# Patient Record
Sex: FEMALE | Race: WHITE | URBAN - METROPOLITAN AREA
[De-identification: names, ages, dates, MRNs, and addresses within clinical notes are randomized per-mention and may not be internally consistent; named-entity substitution may affect disease eponyms.]

---

## 2017-01-19 PROBLEM — M50.20 HNP (HERNIATED NUCLEUS PULPOSUS), CERVICAL: Status: ACTIVE | Noted: 2017-01-19

## 2017-02-07 PROBLEM — E66.09 NON MORBID OBESITY DUE TO EXCESS CALORIES: Status: ACTIVE | Noted: 2017-02-07

## 2017-03-02 PROBLEM — M75.50 SHOULDER BURSITIS: Status: ACTIVE | Noted: 2017-03-02

## 2017-03-02 PROBLEM — M50.222 HERNIATED NUCLEUS PULPOSUS, C5-6 RIGHT: Status: ACTIVE | Noted: 2017-03-02

## 2017-03-03 PROBLEM — M47.817 LUMBOSACRAL SPONDYLOSIS WITHOUT MYELOPATHY: Status: ACTIVE | Noted: 2017-03-03

## 2018-05-03 PROBLEM — M79.7 FIBROMYALGIA AFFECTING MULTIPLE SITES: Status: ACTIVE | Noted: 2018-05-03

## 2018-05-03 PROBLEM — M50.221 HERNIATED NUCLEUS PULPOSUS, C4-5: Status: ACTIVE | Noted: 2017-03-02

## 2018-05-03 PROBLEM — G90.2 HORNER'S SYNDROME: Status: ACTIVE | Noted: 2018-05-03

## 2018-05-03 PROBLEM — G44.209 MUSCLE CONTRACTION HEADACHE SYNDROME: Status: ACTIVE | Noted: 2018-05-03

## 2022-09-28 ENCOUNTER — INITIAL CONSULT (OUTPATIENT)
Dept: PAIN MANAGEMENT | Age: 47
End: 2022-09-28
Payer: COMMERCIAL

## 2022-09-28 VITALS
DIASTOLIC BLOOD PRESSURE: 86 MMHG | BODY MASS INDEX: 31.89 KG/M2 | HEIGHT: 63 IN | SYSTOLIC BLOOD PRESSURE: 128 MMHG | WEIGHT: 180 LBS | TEMPERATURE: 97.9 F

## 2022-09-28 DIAGNOSIS — M96.1 CERVICAL POST-LAMINECTOMY SYNDROME: ICD-10-CM

## 2022-09-28 DIAGNOSIS — M47.817 LUMBOSACRAL SPONDYLOSIS WITHOUT MYELOPATHY: Primary | ICD-10-CM

## 2022-09-28 DIAGNOSIS — M51.36 DDD (DEGENERATIVE DISC DISEASE), LUMBAR: ICD-10-CM

## 2022-09-28 DIAGNOSIS — M47.812 CERVICAL SPONDYLOSIS WITHOUT MYELOPATHY: ICD-10-CM

## 2022-09-28 DIAGNOSIS — M54.16 LUMBAR RADICULOPATHY: ICD-10-CM

## 2022-09-28 PROCEDURE — 4004F PT TOBACCO SCREEN RCVD TLK: CPT | Performed by: PAIN MEDICINE

## 2022-09-28 PROCEDURE — G8427 DOCREV CUR MEDS BY ELIG CLIN: HCPCS | Performed by: PAIN MEDICINE

## 2022-09-28 PROCEDURE — 99203 OFFICE O/P NEW LOW 30 MIN: CPT | Performed by: PAIN MEDICINE

## 2022-09-28 PROCEDURE — G8417 CALC BMI ABV UP PARAM F/U: HCPCS | Performed by: PAIN MEDICINE

## 2022-09-28 RX ORDER — ARIPIPRAZOLE 15 MG/1
15 TABLET ORAL DAILY
COMMUNITY
Start: 2021-08-05

## 2022-09-28 RX ORDER — SERTRALINE HYDROCHLORIDE 100 MG/1
TABLET, FILM COATED ORAL
COMMUNITY
Start: 2022-08-04

## 2022-09-28 RX ORDER — ALPRAZOLAM 1 MG/1
TABLET ORAL
COMMUNITY
Start: 2022-08-29

## 2022-09-28 RX ORDER — OXYCODONE AND ACETAMINOPHEN 10; 325 MG/1; MG/1
TABLET ORAL
COMMUNITY
Start: 2022-06-30

## 2022-09-28 ASSESSMENT — ENCOUNTER SYMPTOMS
RESPIRATORY NEGATIVE: 1
EYES NEGATIVE: 1
ALLERGIC/IMMUNOLOGIC NEGATIVE: 1
GASTROINTESTINAL NEGATIVE: 1

## 2022-09-28 NOTE — PROGRESS NOTES
History of Present Illness     Patient Identification  Jodee Schmitt is a 52 y.o. female. Patient information was obtained from patient. Chief Complaint   Chief Complaint   Patient presents with    Neck Pain    Back Pain       Patient presents with complaint of back pain. This is a result of fall over stairs. Onset of pain was 9 years ago and has been gradually worsening since. The pain is located in right lower back, also has sciatica on Left described as aching in back throbbing in left leg and rated as severe and 10 / 10, with radiation. Symptoms include leg weakness. The patient denies numbness, incontinence. Care prior to arrival consisted of PT, Injections, pain meds with moderate relief. Neck pain 2014, from same accident, radiates to both arms, was told she has capal tunnel syndrome and radiculopathy, weakness drops things off her hands. Past Medical History:   Diagnosis Date    Chronic back pain      Family History   Problem Relation Age of Onset    Arthritis Mother     High Blood Pressure Mother      Current Outpatient Medications   Medication Sig Dispense Refill    ALPRAZolam (XANAX) 1 MG tablet TAKE 1 TABLET BY MOUTH TWICE DAILY      sertraline (ZOLOFT) 100 MG tablet TAKE 1 TABLET BY MOUTH ONCE DAILY      ARIPiprazole (ABILIFY) 15 MG tablet Take 15 mg by mouth daily      oxyCODONE-acetaminophen (PERCOCET)  MG per tablet TAKE 1 TABLET BY MOUTH EVERY 6 HOURS AS NEEDED      ibuprofen (ADVIL) 200 MG tablet Take 1 tablet by mouth 3 times daily 90 tablet 2    gabapentin (NEURONTIN) 300 MG capsule Take 1 capsule by mouth 3 times daily 90 capsule 2    gabapentin (NEURONTIN) 300 MG capsule Take 1 capsule by mouth 3 times daily 90 capsule 0     No current facility-administered medications for this visit. Allergies   Allergen Reactions    Penicillins Hives     rash    Penicillin G        Review of Systems  Review of Systems   Constitutional: Negative. HENT: Negative. Eyes: Negative. Rt sided Horners syndrome, Lazy Eye. Respiratory: Negative. Cardiovascular: Negative. Gastrointestinal: Negative. Endocrine: Negative. Genitourinary: Negative. Hysterectomy   Musculoskeletal: Negative. Skin: Negative. Allergic/Immunologic: Negative. Neurological: Negative. Hematological: Negative. Psychiatric/Behavioral: Negative. Anxiwety, depression. All other systems reviewed and are negative. Physical Exam     /86 (Site: Left Upper Arm)   Temp 97.9 °F (36.6 °C) (Temporal)   Ht 5' 3\" (1.6 m)   Wt 180 lb (81.6 kg)   BMI 31.89 kg/m²   Physical Exam  Vitals and nursing note reviewed. Constitutional:       Appearance: Normal appearance. HENT:      Head: Normocephalic. Right Ear: Ear canal normal.      Left Ear: Ear canal normal.      Nose: Nose normal.      Mouth/Throat:      Mouth: Mucous membranes are moist.   Eyes:      Extraocular Movements: Extraocular movements intact. Conjunctiva/sclera: Conjunctivae normal.      Pupils: Pupils are equal, round, and reactive to light. Cardiovascular:      Rate and Rhythm: Normal rate and regular rhythm. Pulses: Normal pulses. Heart sounds: Normal heart sounds. Pulmonary:      Effort: Pulmonary effort is normal.      Breath sounds: Normal breath sounds. Abdominal:      General: Abdomen is flat. Bowel sounds are normal.      Palpations: Abdomen is soft. Musculoskeletal:         General: Normal range of motion. Cervical back: Normal range of motion and neck supple. Comments: Good gait with a lot of pain also on walking on toes and heels, pain on Flexion and extension in both neck and back, Tenderness in tears almost every where, in neck and back, SLRs painful, Gainslins painful. Skin:     General: Skin is warm. Capillary Refill: Capillary refill takes less than 2 seconds. Neurological:      General: No focal deficit present.       Mental Status: She is alert and oriented to person, place, and time. Mental status is at baseline. Psychiatric:         Mood and Affect: Mood normal.         Behavior: Behavior normal.         Thought Content: Thought content normal.         Judgment: Judgment normal.         Plan     Pt has several procedures from several competent pain docs,   Discussed IT Pump.  Discussed Interventions but they are not practical as we will be needing several procedures which she had possible done before, will follow up in few weeks to Discuss pump placement

## 2025-04-04 NOTE — PROGRESS NOTES
Patient Name: Lorna Sales : 1975        Date: 2025      Type of Appt: Consult    Reason for appt: PSR NECK AND BACK PAIN. XRAY DONE AT [facility]. PREVIOUSLY SEEN AND HAD SURGERY WITH DR. BARKSDALE     Referred by: PSR    Studies done: 3/31/25 Xray of Lumbar [facility]  3/31/25 Xray of Cervical Spine    ( [facility] only pushed Cervical Spine to [facility] )    Surgeries: 2017 Cervical Fusion with Dr Barksdale     Conservative Treatments (Have you ever tried any)  Physical Therapy in the last 6 months to a year: No  NSAID's: Yes  Narcotics: Yes  Muscle relaxants: No  Epidural injections: No    Any Blood Thinners :  [] Yes  [x] No       [] Aspirin    [] Eliquis     [] Xarelto    [] Pletal   [] Plavix    [] Warfarin        Diabetic:  [] Yes   [] No   If yes, prescribed insulin: [] Yes   [x]  No      Do you take any : [] Yes   [x]  No      [] Ozempic   [] Wegovy    [] Trulicity    [] Mounjaro     Smoking: [] Yes   [x] No      REVIEW OF SYSTEMS:    [] Rash     [] Difficulty Urinating   [] Nausea    [] Fever      [x] Headaches    [] Bruising/Bleeding Easily    [] Hearing loss     [] Constipation     [x] Sleep Disturbance   [] Shortness of breath    [] Diarrhea   [x] Neck Pain    [x] Back Pain                         [facility]  [address] [place] [id number]  P: [phone]  F: [phone]          Patient:  Lorna Sales  YOB: 1975  Date: 2025    The patient is a 49 y.o. female who presents today for evaluation of the following problems:     Chief Complaint   Patient presents with    Consultation     3/31/25 Xray of Cervical Spine and and Xray of Lumbar Spine     Neck Pain    Back Pain        Referred by No ref. provider found      PAST MEDICAL, FAMILY AND SOCIAL HISTORY:  Past Medical History:   Diagnosis Date    Chronic back pain      Past Surgical History:   Procedure Laterality Date    SPINE SURGERY  2017    cervical     Family History   Problem Relation

## 2025-04-17 ENCOUNTER — OFFICE VISIT (OUTPATIENT)
Age: 50
End: 2025-04-17
Payer: COMMERCIAL

## 2025-04-17 VITALS — WEIGHT: 175 LBS | BODY MASS INDEX: 34.36 KG/M2 | RESPIRATION RATE: 18 BRPM | HEIGHT: 60 IN

## 2025-04-17 DIAGNOSIS — M47.812 CERVICAL SPONDYLOSIS: ICD-10-CM

## 2025-04-17 DIAGNOSIS — G99.2 STENOSIS OF CERVICAL SPINE WITH MYELOPATHY (HCC): Primary | ICD-10-CM

## 2025-04-17 DIAGNOSIS — M54.16 LEFT LUMBAR RADICULOPATHY: ICD-10-CM

## 2025-04-17 DIAGNOSIS — M47.816 LUMBAR SPONDYLOSIS: ICD-10-CM

## 2025-04-17 DIAGNOSIS — M48.02 STENOSIS OF CERVICAL SPINE WITH MYELOPATHY (HCC): Primary | ICD-10-CM

## 2025-04-17 DIAGNOSIS — M75.51 BURSITIS OF RIGHT SHOULDER: ICD-10-CM

## 2025-04-17 DIAGNOSIS — R29.898 RIGHT LEG WEAKNESS: ICD-10-CM

## 2025-04-17 PROCEDURE — G8417 CALC BMI ABV UP PARAM F/U: HCPCS | Performed by: NEUROLOGICAL SURGERY

## 2025-04-17 PROCEDURE — 1036F TOBACCO NON-USER: CPT | Performed by: NEUROLOGICAL SURGERY

## 2025-04-17 PROCEDURE — 99204 OFFICE O/P NEW MOD 45 MIN: CPT | Performed by: NEUROLOGICAL SURGERY

## 2025-04-17 PROCEDURE — 99203 OFFICE O/P NEW LOW 30 MIN: CPT | Performed by: NEUROLOGICAL SURGERY

## 2025-04-17 PROCEDURE — G8427 DOCREV CUR MEDS BY ELIG CLIN: HCPCS | Performed by: NEUROLOGICAL SURGERY

## 2025-04-17 RX ORDER — DULOXETIN HYDROCHLORIDE 30 MG/1
CAPSULE, DELAYED RELEASE ORAL
COMMUNITY

## 2025-04-17 RX ORDER — TRAMADOL HYDROCHLORIDE 50 MG/1
50 TABLET ORAL EVERY 8 HOURS PRN
Qty: 90 TABLET | Refills: 0 | Status: SHIPPED | OUTPATIENT
Start: 2025-04-17 | End: 2025-05-17

## 2025-04-17 NOTE — PATIENT INSTRUCTIONS
Welcome to our practice and to our [facility] Family! Thank you for choosing us as your health care provider.  In the coming days, you may receive a survey about your visit via text or email. Please take a few minutes to answer these   questions. As our patient, we value your opinion and appreciate your feedback about your [facility] Experience.    The Team That Took Care of you Today:    Care Provider(s): Dr Barksdale     Associate(s):_____________________________________ __________

## 2025-04-21 ENCOUNTER — TELEPHONE (OUTPATIENT)
Age: 50
End: 2025-04-21

## 2025-04-21 NOTE — TELEPHONE ENCOUNTER
Called patient this morning.  She is still dealing with same complaints that she had a last visit with Dr. Barksdale on 4/17/2025.  Today neck pain is worse.  Tramadol prescription is not helping much.     Recommend if tramadol continues to not help, can alternate Tylenol/ibuprofen.  Heat, ice, light stretching and also may help.  Any other pain medication recommend she follow-up with her PCP.  She is to call this morning to start scheduling imaging that Dr. Barksdale has ordered.  Once imaging is obtained, she has follow-up appointment scheduled in 1 month.    Madi Nunez PA-C

## 2025-05-05 ENCOUNTER — TELEPHONE (OUTPATIENT)
Age: 50
End: 2025-05-05

## 2025-05-05 DIAGNOSIS — M47.812 CERVICAL SPONDYLOSIS WITHOUT MYELOPATHY: ICD-10-CM

## 2025-05-05 DIAGNOSIS — M75.52 BURSITIS OF LEFT SHOULDER: ICD-10-CM

## 2025-05-05 DIAGNOSIS — M54.16 LUMBAR RADICULOPATHY: Primary | ICD-10-CM

## 2025-05-05 RX ORDER — OXYCODONE AND ACETAMINOPHEN 7.5; 325 MG/1; MG/1
1 TABLET ORAL 2 TIMES DAILY
Qty: 14 TABLET | Refills: 0 | Status: SHIPPED | OUTPATIENT
Start: 2025-05-05 | End: 2025-05-12

## 2025-05-05 NOTE — TELEPHONE ENCOUNTER
Called patient this morning.  She is continuing to have severe neck pain.  Additionally having severe UE and LE pain.  She is unable to sleep.  She has had to stop working.  Pain is significantly limiting her daily function.  She is scheduled to get an MRI in 2 weeks and have follow-up shortly after with Dr. Barksdale.     She has tried tramadol with no relief.  Additionally has tried OTC ibuprofen/Tylenol, heat/ice, light range of motion.  She was previously given short prescription of Percocet 2 months ago in urgent care for pain.  This significantly helped and allowed her to function previously.    Will place a 7-day prescription for Percocet 7.5-325 mg.  Patient understands any other pain medication management after this will need to be through PCP or pain management.  She understands to take as little as possible and try to wean doses and make them last longer than 7 days.  She will take them only when needed.  PDMP reviewed.    Madi Nunez PA-C

## 2025-05-13 ENCOUNTER — TELEPHONE (OUTPATIENT)
Age: 50
End: 2025-05-13

## 2025-05-13 NOTE — TELEPHONE ENCOUNTER
PATIENTS SCHEDULED MRI WAS DENIED DUE TO NOT MEDICALLY NECESSARY.  REF: [id number]  PEER TO PEER BEING REQUESTED   [phone]

## 2025-05-14 ENCOUNTER — APPOINTMENT (OUTPATIENT)
Dept: MRI IMAGING | Age: 50
End: 2025-05-14
Payer: COMMERCIAL

## 2025-05-14 ENCOUNTER — HOSPITAL ENCOUNTER (OUTPATIENT)
Dept: MRI IMAGING | Age: 50
Discharge: HOME | End: 2025-05-16
Payer: COMMERCIAL

## 2025-05-14 DIAGNOSIS — M47.812 CERVICAL SPONDYLOSIS WITHOUT MYELOPATHY: Primary | ICD-10-CM

## 2025-05-14 DIAGNOSIS — M75.52 BURSITIS OF LEFT SHOULDER: ICD-10-CM

## 2025-05-14 DIAGNOSIS — M47.812 CERVICAL SPONDYLOSIS: ICD-10-CM

## 2025-05-14 DIAGNOSIS — M75.51 BURSITIS OF RIGHT SHOULDER: ICD-10-CM

## 2025-05-14 DIAGNOSIS — M47.816 LUMBAR SPONDYLOSIS: ICD-10-CM

## 2025-05-14 DIAGNOSIS — M48.02 STENOSIS OF CERVICAL SPINE WITH MYELOPATHY (HCC): ICD-10-CM

## 2025-05-14 DIAGNOSIS — M48.02 STENOSIS OF CERVICAL SPINE WITH MYELOPATHY (HCC): Primary | ICD-10-CM

## 2025-05-14 DIAGNOSIS — M54.16 LEFT LUMBAR RADICULOPATHY: ICD-10-CM

## 2025-05-14 DIAGNOSIS — M54.16 LUMBAR RADICULOPATHY: ICD-10-CM

## 2025-05-14 DIAGNOSIS — R29.898 RIGHT LEG WEAKNESS: ICD-10-CM

## 2025-05-14 DIAGNOSIS — M47.812 CERVICAL SPONDYLOSIS WITHOUT MYELOPATHY: ICD-10-CM

## 2025-05-14 DIAGNOSIS — G99.2 STENOSIS OF CERVICAL SPINE WITH MYELOPATHY (HCC): ICD-10-CM

## 2025-05-14 DIAGNOSIS — G99.2 STENOSIS OF CERVICAL SPINE WITH MYELOPATHY (HCC): Primary | ICD-10-CM

## 2025-05-14 PROCEDURE — 72148 MRI LUMBAR SPINE W/O DYE: CPT

## 2025-05-14 PROCEDURE — 73221 MRI JOINT UPR EXTREM W/O DYE: CPT

## 2025-05-14 RX ORDER — OXYCODONE AND ACETAMINOPHEN 5; 325 MG/1; MG/1
1 TABLET ORAL EVERY 6 HOURS PRN
Qty: 28 TABLET | Refills: 0 | Status: SHIPPED | OUTPATIENT
Start: 2025-05-14 | End: 2025-05-21

## 2025-05-14 NOTE — TELEPHONE ENCOUNTER
Called and spoke with Lorna regarding physical therapy and the MRI denial. She is agreeable and will set up an appointment with physical therapy. She plans to complete the other imaging in the mean time. Will plan to appeal or resubmit, whichever is applicable, with physical therapy once she has completed the requirements for insurance.

## 2025-05-14 NOTE — TELEPHONE ENCOUNTER
Please call patient and inform her why MRI Cervical has been denied and Dr. Barksdale has ordered physical therapy.

## 2025-05-14 NOTE — TELEPHONE ENCOUNTER
Requested Prescriptions     Pending Prescriptions Disp Refills    oxyCODONE-acetaminophen (PERCOCET) 7.5-325 MG per tablet 14 tablet 0     Sig: Take 1 tablet by mouth 2 times daily for 7 days. Intended supply: 7 days Max Daily Amount: 2 tablets       Patient last seen on:  04/17/2025    Date of last surgery:  na    Date of last refill:  05/05/2025    Reason for request:  MRI Cervical has been denied. Patient is agreeable to participate in physical therapy is asking for a refill while she begins PT and tries to appeal with insurance     Request date for pharmacy pick-up:  05/14/2025    Next office visit date: 5/22/2025    Penicillins and Penicillin g

## 2025-05-14 NOTE — TELEPHONE ENCOUNTER
Darrian/MELISA has denied MRI Cervical for the following reason(s):     Based on what was given, you have neck pain, your doctor’s request cannot be approved.   A person might need a(n) Cervical Spine MRI if these notes have/has been given: notes from your doctor that show you tried neck exercises (such as formal physical therapy, a medically directed home exercise program, or chiropractic treatments) for six weeks and did not get better. The dates need to show this was done in the last six months. (Documentation that shows participation may include the following: a discharge summary, notes stating the total number of visits with dates, or full details of the home exercise plan and days performed).     Dr. Barksdale may appeal this denial within 60 days with a letter clearly stating the reason(s) for appealing.     How would Dr. Barksdale like to proceed?  Please respond to Neurosurgery Clinical Pool

## 2025-05-15 RX ORDER — OXYCODONE AND ACETAMINOPHEN 7.5; 325 MG/1; MG/1
1 TABLET ORAL 2 TIMES DAILY
Qty: 14 TABLET | Refills: 0 | OUTPATIENT
Start: 2025-05-15 | End: 2025-05-22

## 2025-05-16 NOTE — PROGRESS NOTES
Patient Name: Lorna Sales : 1975        Date: 2025      Type of Appt: Follow up    Reason for appt: Follow up with Studies obtained     Pt last seen by Dr Barksdale  2025    Studies done: TBD MRI of Cervical Spine  2025  MRI Shoulder at [facility]   TBD Xray of Shoulder  2025 MRI of Lumbar at [facility]     Surgeries:  5-6-7 Cervical Fusion with Dr Barksdale     Conservative Treatments (Have you ever tried any)  Physical Therapy in the last 6 months to a year: No  NSAID's: Yes  Narcotics: Yes  Muscle relaxants: No  Epidural injections: No    Any Blood Thinners: [] Yes   [x]  No        [] Aspirin   [] Eliquis   [] Xarelto   [] Pletal   [] Plavix    [] Warfarin        Diabetic:  [] Yes   [x] No   If yes, prescribed insulin:  [] Yes   [x]  No     Weight loss medications:   [] Yes  [x] No     [] Ozempic   [] Wegovy    [] Trulicity    [] Mounjaro         Smoking : [] Yes   [x]  No     Reason for appt: PSR NECK AND BACK PAIN. XRAY DONE AT [facility]. PREVIOUSLY SEEN AND HAD SURGERY WITH DR. BARKSDALE      Referred by: PSR     Studies done: 3/31/25 Xray of Lumbar [facility]  3/31/25 Xray of Cervical Spine    ( [facility] only pushed Cervical Spine to [facility] )     Surgeries:  5-6-7 Cervical Fusion with Dr Barksdale      Conservative Treatments (Have you ever tried any)  Physical Therapy in the last 6 months to a year: No  NSAID's: Yes  Narcotics: Yes  Muscle relaxants: No  Epidural injections: No     Any Blood Thinners :  [] Yes  [x] No        [] Aspirin                      [] Eliquis                      [] Xarelto                      [] Pletal   [] Plavix           [] Warfarin             Diabetic:  [] Yes   [] No   If yes, prescribed insulin: [] Yes   [x]  No       Do you take any : [] Yes   [x]  No       [] Ozempic   [] Wegovy        [] Trulicity         [] Mounjaro      Smoking: [] Yes   [x] No       REVIEW OF SYSTEMS:     [] Rash                         [] Difficulty Urinating   [] Nausea         [] Fever

## 2025-05-22 ENCOUNTER — HOSPITAL ENCOUNTER (OUTPATIENT)
Dept: ORTHOPEDIC SURGERY | Age: 50
Discharge: HOME | End: 2025-05-24
Payer: COMMERCIAL

## 2025-05-22 ENCOUNTER — OFFICE VISIT (OUTPATIENT)
Age: 50
End: 2025-05-22
Payer: COMMERCIAL

## 2025-05-22 VITALS
BODY MASS INDEX: 34.36 KG/M2 | DIASTOLIC BLOOD PRESSURE: 78 MMHG | HEIGHT: 60 IN | SYSTOLIC BLOOD PRESSURE: 122 MMHG | WEIGHT: 175 LBS

## 2025-05-22 DIAGNOSIS — S46.011A TRAUMATIC INCOMPLETE TEAR OF RIGHT ROTATOR CUFF, INITIAL ENCOUNTER: Primary | ICD-10-CM

## 2025-05-22 DIAGNOSIS — Z98.1 S/P CERVICAL SPINAL FUSION: ICD-10-CM

## 2025-05-22 DIAGNOSIS — M47.812 CERVICAL SPONDYLOSIS: ICD-10-CM

## 2025-05-22 DIAGNOSIS — M54.16 LEFT LUMBAR RADICULOPATHY: ICD-10-CM

## 2025-05-22 DIAGNOSIS — S46.011A TRAUMATIC INCOMPLETE TEAR OF RIGHT ROTATOR CUFF, INITIAL ENCOUNTER: ICD-10-CM

## 2025-05-22 DIAGNOSIS — G99.2 STENOSIS OF CERVICAL SPINE WITH MYELOPATHY (HCC): ICD-10-CM

## 2025-05-22 DIAGNOSIS — M48.02 STENOSIS OF CERVICAL SPINE WITH MYELOPATHY (HCC): ICD-10-CM

## 2025-05-22 DIAGNOSIS — M47.816 LUMBAR SPONDYLOSIS: ICD-10-CM

## 2025-05-22 PROCEDURE — G8417 CALC BMI ABV UP PARAM F/U: HCPCS | Performed by: NEUROLOGICAL SURGERY

## 2025-05-22 PROCEDURE — 99213 OFFICE O/P EST LOW 20 MIN: CPT | Performed by: NEUROLOGICAL SURGERY

## 2025-05-22 PROCEDURE — G8427 DOCREV CUR MEDS BY ELIG CLIN: HCPCS | Performed by: NEUROLOGICAL SURGERY

## 2025-05-22 PROCEDURE — 73030 X-RAY EXAM OF SHOULDER: CPT

## 2025-05-22 PROCEDURE — 99214 OFFICE O/P EST MOD 30 MIN: CPT | Performed by: NEUROLOGICAL SURGERY

## 2025-05-22 PROCEDURE — 1036F TOBACCO NON-USER: CPT | Performed by: NEUROLOGICAL SURGERY

## 2025-05-22 RX ORDER — OXYCODONE AND ACETAMINOPHEN 5; 325 MG/1; MG/1
1 TABLET ORAL EVERY 6 HOURS PRN
Qty: 120 TABLET | Refills: 0 | Status: SHIPPED | OUTPATIENT
Start: 2025-05-22 | End: 2025-06-21

## 2025-06-02 ENCOUNTER — HOSPITAL ENCOUNTER (OUTPATIENT)
Dept: MRI IMAGING | Age: 50
Discharge: HOME | End: 2025-06-04
Payer: COMMERCIAL

## 2025-06-02 DIAGNOSIS — M47.812 CERVICAL SPONDYLOSIS: ICD-10-CM

## 2025-06-02 DIAGNOSIS — M54.16 LEFT LUMBAR RADICULOPATHY: ICD-10-CM

## 2025-06-02 DIAGNOSIS — R29.898 RIGHT LEG WEAKNESS: ICD-10-CM

## 2025-06-02 DIAGNOSIS — M48.02 STENOSIS OF CERVICAL SPINE WITH MYELOPATHY (HCC): ICD-10-CM

## 2025-06-02 DIAGNOSIS — G99.2 STENOSIS OF CERVICAL SPINE WITH MYELOPATHY (HCC): ICD-10-CM

## 2025-06-02 DIAGNOSIS — M75.51 BURSITIS OF RIGHT SHOULDER: ICD-10-CM

## 2025-06-02 DIAGNOSIS — M47.816 LUMBAR SPONDYLOSIS: ICD-10-CM

## 2025-06-02 PROCEDURE — 72141 MRI NECK SPINE W/O DYE: CPT

## 2025-06-04 ENCOUNTER — HOSPITAL ENCOUNTER (OUTPATIENT)
Dept: PHYSICAL THERAPY | Age: 50
Setting detail: THERAPIES SERIES
Discharge: HOME | End: 2025-06-04
Attending: NEUROLOGICAL SURGERY

## 2025-06-06 NOTE — PROGRESS NOTES
long-term right lower extremity weakness and numbness with loss of muscle mass longer than the last 3 to 4 months and decreased mobility of cervical spine.     More recent symptoms are the severe pain right shoulder radiating into the forearm up into the right side of the neck.  Going over to the left side up to the back of her neck with headaches.  She gets shocking type of headaches and sometimes the shocks will go down into her right arm and all the way down into the low back.  When the pain is bad she will have shaking in her right hand but not on the left and sometimes her right hand gets frozen and is hard to move.  Pain radiates diffusely lumbar and down into the numb area on the right lower extremity but the pain is more severe in the left lower extremity diffusely.  Overall quality of life is very poor.     2017 5-6-7 Cervical Fusion with Dr Barksdale.  Postoperative no difficulty swallowing.  Has developed choking sensation last few months having to push on her lower throat to swallow.  Has consult to ENT.  Needs MRI cervical spine ordered.  May need swallowing test     Patient claims has too much pain right shoulder for physical therapy.  Instructed on heat and range of motion at home and reconsider if PT     2 courses of steroids no help January and then February 2025     3/31/2025 dynamic cervical spine x-rays AP lateral flexion-extension showed no translation.  Healed C5-6-7 anterior cervical discectomy fusion     5/14/2025 MRI lumbar spine mild to moderate degenerative changes.       5/20/2025 MRI right shoulder   1. Supraspinatus tendinosis with high-grade partial-thickness interstitial  tear at the critical zone. No convincing full-thickness retraction.  2. Mild subacromial/subdeltoid bursitis.  3. Possible superior labral tear versus sublabral recess.  4. Mild glenohumeral and acromioclavicular joint osteoarthritis.     5/22/2025 x-ray right shoulder acromioclavicular degenerative changes    6/2/2025

## 2025-06-10 ENCOUNTER — OFFICE VISIT (OUTPATIENT)
Age: 50
End: 2025-06-10
Payer: COMMERCIAL

## 2025-06-10 VITALS
TEMPERATURE: 98.8 F | OXYGEN SATURATION: 96 % | BODY MASS INDEX: 34.36 KG/M2 | WEIGHT: 175 LBS | HEIGHT: 60 IN | HEART RATE: 78 BPM | SYSTOLIC BLOOD PRESSURE: 122 MMHG | DIASTOLIC BLOOD PRESSURE: 88 MMHG

## 2025-06-10 VITALS — BODY MASS INDEX: 34.36 KG/M2 | RESPIRATION RATE: 16 BRPM | HEIGHT: 60 IN | WEIGHT: 175 LBS

## 2025-06-10 DIAGNOSIS — M47.812 CERVICAL SPONDYLOSIS: ICD-10-CM

## 2025-06-10 DIAGNOSIS — G89.29 CHRONIC RIGHT SHOULDER PAIN: Primary | ICD-10-CM

## 2025-06-10 DIAGNOSIS — Z98.1 S/P CERVICAL SPINAL FUSION: ICD-10-CM

## 2025-06-10 DIAGNOSIS — M54.12 RIGHT CERVICAL RADICULOPATHY: ICD-10-CM

## 2025-06-10 DIAGNOSIS — M25.511 ACUTE PAIN OF RIGHT SHOULDER: Primary | ICD-10-CM

## 2025-06-10 DIAGNOSIS — M25.511 CHRONIC RIGHT SHOULDER PAIN: Primary | ICD-10-CM

## 2025-06-10 PROCEDURE — 99202 OFFICE O/P NEW SF 15 MIN: CPT | Performed by: ORTHOPAEDIC SURGERY

## 2025-06-10 PROCEDURE — 99212 OFFICE O/P EST SF 10 MIN: CPT | Performed by: NEUROLOGICAL SURGERY

## 2025-06-10 PROCEDURE — 1036F TOBACCO NON-USER: CPT | Performed by: ORTHOPAEDIC SURGERY

## 2025-06-10 PROCEDURE — G8427 DOCREV CUR MEDS BY ELIG CLIN: HCPCS | Performed by: ORTHOPAEDIC SURGERY

## 2025-06-10 PROCEDURE — G8427 DOCREV CUR MEDS BY ELIG CLIN: HCPCS | Performed by: NEUROLOGICAL SURGERY

## 2025-06-10 PROCEDURE — 99213 OFFICE O/P EST LOW 20 MIN: CPT | Performed by: NEUROLOGICAL SURGERY

## 2025-06-10 PROCEDURE — G8417 CALC BMI ABV UP PARAM F/U: HCPCS | Performed by: NEUROLOGICAL SURGERY

## 2025-06-10 PROCEDURE — 99203 OFFICE O/P NEW LOW 30 MIN: CPT | Performed by: ORTHOPAEDIC SURGERY

## 2025-06-10 PROCEDURE — 1036F TOBACCO NON-USER: CPT | Performed by: NEUROLOGICAL SURGERY

## 2025-06-10 PROCEDURE — G8417 CALC BMI ABV UP PARAM F/U: HCPCS | Performed by: ORTHOPAEDIC SURGERY

## 2025-06-10 NOTE — PROGRESS NOTES
HISTORY AND PHYSICAL               Date: 6/10/2025        Patient Name: Lorna Sales     MRN: [id number] YOB: 1975      Age:  49 y.o.      Assessment/Plan       Diagnosis Orders   1. Chronic right shoulder pain  Ambulatory referral to Pain Medicine          Chronic right shoulder pain.  MRI without any obvious or concrete surgical indications    Weightbearing status: Weight-bearing as tolerated right upper extremity  Offered patient subacromial steroid injection which she accepted, with patient's permission right subacromial steroid injection performed in office  Discussed oral anti-inflammatories, patient has taken previously without benefit so no additional oral anti-inflammatories recommended or prescribed  Discussed oral steroid taper she did not like the way they made her feel because of this I do not think this will be a good option for her  Discussed physical therapy.  Patient has previously done therapy for her neck and shoulders which did not help  Discussed case informally with colleagues, recommended referral to pain management for consideration of peripheral nerve blocks and possible nerve ablation  If patient fails to improve would refer to shoulder specialist    Follow-up: Patient welcome to follow-up with me as needed anytime, if shoulder pain persist he referring to shoulder specialist given complexity of case      **PROCEDURE NOTE: Right shoulder subacromial space steroid injection  Right shoulder prepped with alcohol  Skin anesthetized with ethylene chloride  4 cc 1% lidocaine plain and 1 cc (40 mg) Kenalog injected into the Right subacromial space using a lateral approach  Injection site covered with Band-Aid  Patient tolerated procedure well     Orders Placed This Encounter   Procedures    Ambulatory referral to Pain Medicine     Referral Priority:   Routine     Referral Type:   Eval and Treat     Referral Reason:   Specialty Services Required     Requested Specialty:   Pain

## 2025-06-17 DIAGNOSIS — S46.011A TRAUMATIC INCOMPLETE TEAR OF RIGHT ROTATOR CUFF, INITIAL ENCOUNTER: ICD-10-CM

## 2025-06-17 RX ORDER — OXYCODONE AND ACETAMINOPHEN 5; 325 MG/1; MG/1
1 TABLET ORAL EVERY 6 HOURS PRN
Qty: 120 TABLET | Refills: 0 | Status: SHIPPED | OUTPATIENT
Start: 2025-06-17 | End: 2025-07-17

## 2025-07-16 NOTE — PROGRESS NOTES
much pain right shoulder for physical therapy.  Instructed on heat and range of motion at home and reconsider if PT     2 courses of steroids no help January and then February 2025     3/31/2025 dynamic cervical spine x-rays AP lateral flexion-extension showed no translation.  Healed C5-6-7 anterior cervical discectomy fusion     5/14/2025 MRI lumbar spine mild to moderate degenerative changes.       5/20/2025 MRI right shoulder   1. Supraspinatus tendinosis with high-grade partial-thickness interstitial  tear at the critical zone. No convincing full-thickness retraction.  2. Mild subacromial/subdeltoid bursitis.  3. Possible superior labral tear versus sublabral recess.  4. Mild glenohumeral and acromioclavicular joint osteoarthritis.     5/22/2025 x-ray right shoulder acromioclavicular degenerative changes     6/2/2025 MRI cervical spine on the right side mild to moderate foraminal stenosis.  Central canal mild to moderate narrowing with preservation of spinal fluid around the cord at all levels.     Correlating patient's symptoms neck and right shoulder arm pain pain generator right acromial clavicular degenerative changes.  Has mild to moderate central canal narrowing and right sided foraminal narrowing at C3-4.     Dr. Saldana orthopedics 6/10/2025.  See note for details  Dr. Webb orthopedics scheduling: Right shoulder diagnostic arthroscopy with possible rotator cuff repair arthroscopically.  Open distal clavicle excision     May require staged procedures bilateral carpal tunnel syndrome.  Worse on the left.  Plan left carpal tunnel repair.  Staged procedure right carpal tunnel repair.  The surgical/medical procedure, indications and risks have been discussed. There is a low chance of having any type of risk, but risks are still present including serious risks as pain, bleeding, infection, death, paralysis, sensory loss, bladder bowel and sexual dysfunction, chance of recurrence and so forth. Surgery is not a

## 2025-07-17 DIAGNOSIS — S46.011A TRAUMATIC INCOMPLETE TEAR OF RIGHT ROTATOR CUFF, INITIAL ENCOUNTER: ICD-10-CM

## 2025-07-17 RX ORDER — OXYCODONE AND ACETAMINOPHEN 5; 325 MG/1; MG/1
1 TABLET ORAL EVERY 6 HOURS PRN
Qty: 56 TABLET | Refills: 0 | Status: SHIPPED | OUTPATIENT
Start: 2025-07-17 | End: 2025-07-31

## 2025-07-17 NOTE — TELEPHONE ENCOUNTER
Requested Prescriptions     Pending Prescriptions Disp Refills    oxyCODONE-acetaminophen (PERCOCET) 5-325 MG per tablet 56 tablet 0     Sig: Take 1 tablet by mouth every 6 hours as needed for Pain for up to 14 days. Intended supply: 7 days. Take lowest dose possible to manage pain Max Daily Amount: 4 tablets       Patient last seen on:  6/10/25    Date of last refill:  6/17/25    Reason for request:  patient requesting short supply till 7/31/25    Request date for pharmacy pick-up:  7/16/25    Next office visit date: 7/31/25

## 2025-07-21 ENCOUNTER — OFFICE VISIT (OUTPATIENT)
Age: 50
End: 2025-07-21
Payer: COMMERCIAL

## 2025-07-21 VITALS
WEIGHT: 175 LBS | OXYGEN SATURATION: 98 % | BODY MASS INDEX: 34.36 KG/M2 | TEMPERATURE: 98 F | HEIGHT: 60 IN | HEART RATE: 86 BPM

## 2025-07-21 DIAGNOSIS — M19.019 OSTEOARTHRITIS OF ACROMIOCLAVICULAR JOINT: Primary | ICD-10-CM

## 2025-07-21 DIAGNOSIS — S46.011A TRAUMATIC INCOMPLETE TEAR OF RIGHT ROTATOR CUFF, INITIAL ENCOUNTER: ICD-10-CM

## 2025-07-21 PROCEDURE — G8427 DOCREV CUR MEDS BY ELIG CLIN: HCPCS | Performed by: ORTHOPAEDIC SURGERY

## 2025-07-21 PROCEDURE — 99203 OFFICE O/P NEW LOW 30 MIN: CPT | Performed by: ORTHOPAEDIC SURGERY

## 2025-07-21 PROCEDURE — 1036F TOBACCO NON-USER: CPT | Performed by: ORTHOPAEDIC SURGERY

## 2025-07-21 PROCEDURE — G8417 CALC BMI ABV UP PARAM F/U: HCPCS | Performed by: ORTHOPAEDIC SURGERY

## 2025-07-21 PROCEDURE — 99204 OFFICE O/P NEW MOD 45 MIN: CPT | Performed by: ORTHOPAEDIC SURGERY

## 2025-07-22 NOTE — PROGRESS NOTES
___________________  [] Other: ____________________________________________________      Physician Signature Required: Amado Webb DO Date/Time: 7/22/2025       MRI CERVICAL SPINE WO CONTRAST  Narrative: EXAMINATION:  MRI OF THE CERVICAL SPINE WITHOUT CONTRAST 6/2/2025 1:24 pm    TECHNIQUE:  Multiplanar multisequence MRI of the cervical spine was performed without the  administration of intravenous contrast.    COMPARISON:  None.    HISTORY:  ORDERING SYSTEM PROVIDED HISTORY: Stenosis of cervical spine with myelopathy  (HCC), Stenosis of cervical spine with myelopathy (HCC), Bursitis of right  shoulder, Cervical spondylosis, Left lumbar radiculopathy, Lumbar  spondylosis, Right leg weakness  TECHNOLOGIST PROVIDED HISTORY:  What reading provider will be dictating this exam?->CRC    Initial evaluation    FINDINGS:  BONES/ALIGNMENT: There has been anterior cervical spine fusion from C5  through C7. Hardware artifact obscures and distorts anatomic detail at these  levels.  There is disc space narrowing and osteophytes C3-4, C4-5, and C7-T1.    SPINAL CORD: No abnormal signal is identified within the spinal cord.    SOFT TISSUES: No paraspinal mass identified.    C2-C3: The thecal sac is not stenotic.  The neural foramina are intact.    C3-C4: Disc bulge measuring 2-3 mm osteophytes. Disc and/or osteophytes  result in moderate narrowing of the neural foramina bilaterally. The right  neural foramen is narrowed greater than the left.  The thecal sac is mildly  stenotic.    C4-C5: There is a disc protrusion measuring 2-3 mm.  The thecal sac is mildly  stenotic.    C5-C6: Prior surgery.  The thecal sac is moderately stenotic.  The neural  foramina appear to be intact.    C6-C7: Prior surgery.  The thecal sac is moderately stenotic.    C7-T1: There is disc protrusion osteophyte toward the left causing moderate  narrowing of the left neural foramen.  There is mild of right foramen.  The  thecal sac is mildly

## 2025-07-30 ENCOUNTER — HOSPITAL ENCOUNTER (OUTPATIENT)
Dept: NEUROLOGY | Age: 50
Discharge: HOME | End: 2025-07-30
Payer: COMMERCIAL

## 2025-07-30 DIAGNOSIS — M54.12 RIGHT CERVICAL RADICULOPATHY: ICD-10-CM

## 2025-07-30 DIAGNOSIS — M47.812 CERVICAL SPONDYLOSIS: ICD-10-CM

## 2025-07-30 DIAGNOSIS — M25.511 ACUTE PAIN OF RIGHT SHOULDER: ICD-10-CM

## 2025-07-30 DIAGNOSIS — Z98.1 S/P CERVICAL SPINAL FUSION: ICD-10-CM

## 2025-07-30 PROCEDURE — 95910 NRV CNDJ TEST 7-8 STUDIES: CPT

## 2025-07-30 PROCEDURE — 95886 MUSC TEST DONE W/N TEST COMP: CPT

## 2025-07-30 NOTE — PROCEDURES
[facility]                   [address] [place] [address]                             ELECTROMYOGRAM      PATIENT NAME: LISE COHEN               : 1975  MED REC NO: [id number]                        ROOM:   ACCOUNT NO: [id number]                       ADMIT DATE: 2025  PROVIDER: Ge Rose MD      REFERRING PHYSICIAN:  NAZIA BARKSDALE    REASON FOR THE STUDY:  The patient was having lot of pain in the right shoulder.  She had woken with it.    She has a history of neck surgery, bilateral.  She gets a numbness in the hands, being worse on the left side.  She is having neck pain also and is going to be evaluated further.    Motor nerve conduction velocities are normal in all the nerves tested.    F-wave latencies are borderline in the left median nerve and normal in other nerves tested.    Distal motor and sensory latencies are normal in ulnar nerves, but delayed in the median nerves, being much worse on the left side.    On the concentric needle electrode examination, denervation changes are apparent in the small muscles of the hands bilaterally.    Significant denervation seen in the C7 root distribution, being worse on the left side and as such she has mild involvement of the C5, C6, and C8 root also.    The patient had difficulty giving effort due to the pain in the right upper extremity, mostly in the right shoulder.    CLINICAL INTERPRETATION:    1. Severe left median nerve compression neuropathy at the wrist consistent with diagnosis of severe left carpal tunnel syndrome.  2. Moderate compression of the right median nerve at the wrist consistent with diagnosis of moderate right carpal tunnel syndrome.  3. Severe bilateral C7 radiculopathy, being worse on the left side with mild involvement of the C5, C6, and C8 roots bilaterally.  Thank you, Dr. Barksdale for allowing me to see the patient.  Please feel free to call me if I can be of any further assistance

## 2025-07-31 ENCOUNTER — OFFICE VISIT (OUTPATIENT)
Age: 50
End: 2025-07-31
Payer: COMMERCIAL

## 2025-07-31 VITALS — WEIGHT: 175 LBS | RESPIRATION RATE: 18 BRPM | BODY MASS INDEX: 34.36 KG/M2 | HEIGHT: 60 IN

## 2025-07-31 DIAGNOSIS — G56.01 RIGHT CARPAL TUNNEL SYNDROME: ICD-10-CM

## 2025-07-31 DIAGNOSIS — Z98.1 S/P CERVICAL SPINAL FUSION: ICD-10-CM

## 2025-07-31 DIAGNOSIS — G56.02 LEFT CARPAL TUNNEL SYNDROME: ICD-10-CM

## 2025-07-31 DIAGNOSIS — S46.011A TRAUMATIC INCOMPLETE TEAR OF RIGHT ROTATOR CUFF, INITIAL ENCOUNTER: Primary | ICD-10-CM

## 2025-07-31 PROCEDURE — 99212 OFFICE O/P EST SF 10 MIN: CPT | Performed by: NEUROLOGICAL SURGERY

## 2025-07-31 RX ORDER — SODIUM CHLORIDE 0.9 % (FLUSH) 0.9 %
5-40 SYRINGE (ML) INJECTION PRN
OUTPATIENT
Start: 2025-07-31

## 2025-07-31 RX ORDER — SODIUM CHLORIDE 9 MG/ML
INJECTION, SOLUTION INTRAVENOUS PRN
OUTPATIENT
Start: 2025-07-31

## 2025-07-31 RX ORDER — OXYCODONE AND ACETAMINOPHEN 5; 325 MG/1; MG/1
1 TABLET ORAL 4 TIMES DAILY
Qty: 56 TABLET | Refills: 0 | Status: SHIPPED | OUTPATIENT
Start: 2025-07-31 | End: 2025-08-14

## 2025-07-31 RX ORDER — SODIUM CHLORIDE 0.9 % (FLUSH) 0.9 %
5-40 SYRINGE (ML) INJECTION EVERY 12 HOURS SCHEDULED
OUTPATIENT
Start: 2025-07-31

## 2025-08-14 ENCOUNTER — TELEPHONE (OUTPATIENT)
Age: 50
End: 2025-08-14

## 2025-08-18 ENCOUNTER — TELEPHONE (OUTPATIENT)
Age: 50
End: 2025-08-18

## 2025-08-18 DIAGNOSIS — S46.011A TRAUMATIC INCOMPLETE TEAR OF RIGHT ROTATOR CUFF, INITIAL ENCOUNTER: ICD-10-CM

## 2025-08-18 RX ORDER — OXYCODONE AND ACETAMINOPHEN 5; 325 MG/1; MG/1
1 TABLET ORAL 4 TIMES DAILY
Qty: 56 TABLET | Refills: 0 | Status: SHIPPED | OUTPATIENT
Start: 2025-08-18 | End: 2025-09-01

## 2025-08-22 PROBLEM — M19.019 DEGENERATIVE JOINT DISEASE OF SHOULDER REGION: Status: ACTIVE | Noted: 2025-08-22

## 2025-08-27 ENCOUNTER — OFFICE VISIT (OUTPATIENT)
Age: 50
End: 2025-08-27

## 2025-08-27 VITALS
HEIGHT: 61 IN | DIASTOLIC BLOOD PRESSURE: 86 MMHG | SYSTOLIC BLOOD PRESSURE: 128 MMHG | BODY MASS INDEX: 37.23 KG/M2 | HEART RATE: 75 BPM | WEIGHT: 197.2 LBS | OXYGEN SATURATION: 97 % | TEMPERATURE: 97.7 F

## 2025-08-27 DIAGNOSIS — Z01.818 PREOP EXAMINATION: Primary | ICD-10-CM

## 2025-08-27 DIAGNOSIS — Z01.818 PREOP EXAMINATION: ICD-10-CM

## 2025-08-27 LAB
ANION GAP SERPL CALCULATED.3IONS-SCNC: 10 MEQ/L (ref 9–15)
BUN SERPL-MCNC: 8 MG/DL (ref 6–20)
CALCIUM SERPL-MCNC: 9.7 MG/DL (ref 8.5–9.9)
CHLORIDE SERPL-SCNC: 99 MEQ/L (ref 95–107)
CO2 SERPL-SCNC: 24 MEQ/L (ref 20–31)
CREAT SERPL-MCNC: 0.5 MG/DL (ref 0.5–0.9)
ERYTHROCYTE [DISTWIDTH] IN BLOOD BY AUTOMATED COUNT: 12.7 % (ref 11.5–14.5)
GLUCOSE SERPL-MCNC: 210 MG/DL (ref 70–99)
HCT VFR BLD AUTO: 38.2 % (ref 37–47)
HGB BLD-MCNC: 12.8 G/DL (ref 12–16)
MCH RBC QN AUTO: 32.9 PG (ref 27–31.3)
MCHC RBC AUTO-ENTMCNC: 33.5 % (ref 33–37)
MCV RBC AUTO: 98.2 FL (ref 79.4–94.8)
PLATELET # BLD AUTO: 312 K/UL (ref 130–400)
POTASSIUM SERPL-SCNC: 4.4 MEQ/L (ref 3.4–4.9)
RBC # BLD AUTO: 3.89 M/UL (ref 4.2–5.4)
SODIUM SERPL-SCNC: 133 MEQ/L (ref 135–144)
WBC # BLD AUTO: 9 K/UL (ref 4.8–10.8)

## 2025-08-27 PROCEDURE — 99024 POSTOP FOLLOW-UP VISIT: CPT | Performed by: PHYSICIAN ASSISTANT

## 2025-08-27 RX ORDER — SODIUM CHLORIDE, SODIUM LACTATE, POTASSIUM CHLORIDE, CALCIUM CHLORIDE 600; 310; 30; 20 MG/100ML; MG/100ML; MG/100ML; MG/100ML
INJECTION, SOLUTION INTRAVENOUS CONTINUOUS
OUTPATIENT
Start: 2025-08-27

## 2025-08-27 RX ORDER — SODIUM CHLORIDE 0.9 % (FLUSH) 0.9 %
5-40 SYRINGE (ML) INJECTION EVERY 12 HOURS SCHEDULED
OUTPATIENT
Start: 2025-08-27

## 2025-08-27 RX ORDER — SODIUM CHLORIDE 0.9 % (FLUSH) 0.9 %
5-40 SYRINGE (ML) INJECTION PRN
OUTPATIENT
Start: 2025-08-27

## 2025-08-27 RX ORDER — SODIUM CHLORIDE 9 MG/ML
INJECTION, SOLUTION INTRAVENOUS PRN
OUTPATIENT
Start: 2025-08-27

## 2025-09-04 ENCOUNTER — ANESTHESIA (OUTPATIENT)
Dept: OPERATING ROOM | Age: 50
End: 2025-09-04
Payer: MEDICAID

## 2025-09-04 ENCOUNTER — ANESTHESIA EVENT (OUTPATIENT)
Dept: OPERATING ROOM | Age: 50
End: 2025-09-04
Payer: MEDICAID

## 2025-09-04 ENCOUNTER — HOSPITAL ENCOUNTER (OUTPATIENT)
Age: 50
Setting detail: OUTPATIENT SURGERY
Discharge: HOME | End: 2025-09-04
Attending: ORTHOPAEDIC SURGERY | Admitting: ORTHOPAEDIC SURGERY
Payer: MEDICAID

## 2025-09-04 VITALS
BODY MASS INDEX: 35.12 KG/M2 | HEIGHT: 61 IN | WEIGHT: 186 LBS | TEMPERATURE: 97.7 F | DIASTOLIC BLOOD PRESSURE: 56 MMHG | SYSTOLIC BLOOD PRESSURE: 112 MMHG | OXYGEN SATURATION: 93 % | RESPIRATION RATE: 17 BRPM | HEART RATE: 80 BPM

## 2025-09-04 DIAGNOSIS — M25.511 ACUTE PAIN OF RIGHT SHOULDER: Primary | ICD-10-CM

## 2025-09-04 PROCEDURE — 6360000002 HC RX W HCPCS: Performed by: REGISTERED NURSE

## 2025-09-04 PROCEDURE — 6360000002 HC RX W HCPCS: Performed by: PHYSICIAN ASSISTANT

## 2025-09-04 PROCEDURE — 3700000000 HC ANESTHESIA ATTENDED CARE: Performed by: ORTHOPAEDIC SURGERY

## 2025-09-04 PROCEDURE — 6360000002 HC RX W HCPCS: Performed by: ANESTHESIOLOGY

## 2025-09-04 PROCEDURE — 2709999900 HC NON-CHARGEABLE SUPPLY: Performed by: ORTHOPAEDIC SURGERY

## 2025-09-04 PROCEDURE — 64415 NJX AA&/STRD BRCH PLXS IMG: CPT | Performed by: ANESTHESIOLOGY

## 2025-09-04 PROCEDURE — 7100000001 HC PACU RECOVERY - ADDTL 15 MIN: Performed by: ORTHOPAEDIC SURGERY

## 2025-09-04 PROCEDURE — 2500000003 HC RX 250 WO HCPCS: Performed by: REGISTERED NURSE

## 2025-09-04 PROCEDURE — 6360000002 HC RX W HCPCS: Performed by: ORTHOPAEDIC SURGERY

## 2025-09-04 PROCEDURE — 6370000000 HC RX 637 (ALT 250 FOR IP): Performed by: ANESTHESIOLOGY

## 2025-09-04 PROCEDURE — 7100000010 HC PHASE II RECOVERY - FIRST 15 MIN: Performed by: ORTHOPAEDIC SURGERY

## 2025-09-04 PROCEDURE — 3600000005 HC SURGERY LEVEL 5 BASE: Performed by: ORTHOPAEDIC SURGERY

## 2025-09-04 PROCEDURE — 2580000003 HC RX 258: Performed by: PHYSICIAN ASSISTANT

## 2025-09-04 PROCEDURE — 7100000000 HC PACU RECOVERY - FIRST 15 MIN: Performed by: ORTHOPAEDIC SURGERY

## 2025-09-04 PROCEDURE — 7100000011 HC PHASE II RECOVERY - ADDTL 15 MIN: Performed by: ORTHOPAEDIC SURGERY

## 2025-09-04 PROCEDURE — 2500000003 HC RX 250 WO HCPCS: Performed by: ORTHOPAEDIC SURGERY

## 2025-09-04 PROCEDURE — 3700000001 HC ADD 15 MINUTES (ANESTHESIA): Performed by: ORTHOPAEDIC SURGERY

## 2025-09-04 PROCEDURE — 3600000015 HC SURGERY LEVEL 5 ADDTL 15MIN: Performed by: ORTHOPAEDIC SURGERY

## 2025-09-04 PROCEDURE — 2500000003 HC RX 250 WO HCPCS: Performed by: PHYSICIAN ASSISTANT

## 2025-09-04 RX ORDER — FENTANYL CITRATE 0.05 MG/ML
50 INJECTION, SOLUTION INTRAMUSCULAR; INTRAVENOUS EVERY 10 MIN PRN
Status: DISCONTINUED | OUTPATIENT
Start: 2025-09-04 | End: 2025-09-04 | Stop reason: HOSPADM

## 2025-09-04 RX ORDER — METOCLOPRAMIDE HYDROCHLORIDE 5 MG/ML
10 INJECTION INTRAMUSCULAR; INTRAVENOUS
Status: DISCONTINUED | OUTPATIENT
Start: 2025-09-04 | End: 2025-09-04 | Stop reason: HOSPADM

## 2025-09-04 RX ORDER — SODIUM CHLORIDE, SODIUM LACTATE, POTASSIUM CHLORIDE, CALCIUM CHLORIDE 600; 310; 30; 20 MG/100ML; MG/100ML; MG/100ML; MG/100ML
INJECTION, SOLUTION INTRAVENOUS CONTINUOUS
Status: DISCONTINUED | OUTPATIENT
Start: 2025-09-04 | End: 2025-09-04 | Stop reason: HOSPADM

## 2025-09-04 RX ORDER — SODIUM CHLORIDE 0.9 % (FLUSH) 0.9 %
5-40 SYRINGE (ML) INJECTION PRN
Status: DISCONTINUED | OUTPATIENT
Start: 2025-09-04 | End: 2025-09-04 | Stop reason: HOSPADM

## 2025-09-04 RX ORDER — OXYCODONE AND ACETAMINOPHEN 5; 325 MG/1; MG/1
1 TABLET ORAL EVERY 6 HOURS PRN
Qty: 28 TABLET | Refills: 0 | Status: SHIPPED | OUTPATIENT
Start: 2025-09-04 | End: 2025-09-11

## 2025-09-04 RX ORDER — ROCURONIUM BROMIDE 10 MG/ML
INJECTION, SOLUTION INTRAVENOUS
Status: DISCONTINUED | OUTPATIENT
Start: 2025-09-04 | End: 2025-09-04 | Stop reason: SDUPTHER

## 2025-09-04 RX ORDER — ONDANSETRON 2 MG/ML
4 INJECTION INTRAMUSCULAR; INTRAVENOUS
Status: DISCONTINUED | OUTPATIENT
Start: 2025-09-04 | End: 2025-09-04 | Stop reason: HOSPADM

## 2025-09-04 RX ORDER — SODIUM CHLORIDE 9 MG/ML
INJECTION, SOLUTION INTRAVENOUS PRN
Status: DISCONTINUED | OUTPATIENT
Start: 2025-09-04 | End: 2025-09-04 | Stop reason: HOSPADM

## 2025-09-04 RX ORDER — DEXAMETHASONE SODIUM PHOSPHATE 10 MG/ML
INJECTION, SOLUTION INTRA-ARTICULAR; INTRALESIONAL; INTRAMUSCULAR; INTRAVENOUS; SOFT TISSUE
Status: DISCONTINUED | OUTPATIENT
Start: 2025-09-04 | End: 2025-09-04 | Stop reason: SDUPTHER

## 2025-09-04 RX ORDER — ROPIVACAINE HYDROCHLORIDE 5 MG/ML
INJECTION, SOLUTION EPIDURAL; INFILTRATION; PERINEURAL
Status: DISCONTINUED | OUTPATIENT
Start: 2025-09-04 | End: 2025-09-04 | Stop reason: SDUPTHER

## 2025-09-04 RX ORDER — MAGNESIUM HYDROXIDE 1200 MG/15ML
LIQUID ORAL CONTINUOUS PRN
Status: COMPLETED | OUTPATIENT
Start: 2025-09-04 | End: 2025-09-04

## 2025-09-04 RX ORDER — LABETALOL HYDROCHLORIDE 5 MG/ML
INJECTION, SOLUTION INTRAVENOUS
Status: DISCONTINUED | OUTPATIENT
Start: 2025-09-04 | End: 2025-09-04 | Stop reason: SDUPTHER

## 2025-09-04 RX ORDER — SODIUM CHLORIDE 0.9 % (FLUSH) 0.9 %
5-40 SYRINGE (ML) INJECTION EVERY 12 HOURS SCHEDULED
Status: DISCONTINUED | OUTPATIENT
Start: 2025-09-04 | End: 2025-09-04 | Stop reason: HOSPADM

## 2025-09-04 RX ORDER — EPHEDRINE SULFATE/0.9% NACL/PF 25 MG/5 ML
SYRINGE (ML) INTRAVENOUS
Status: DISCONTINUED | OUTPATIENT
Start: 2025-09-04 | End: 2025-09-04 | Stop reason: SDUPTHER

## 2025-09-04 RX ORDER — ONDANSETRON 2 MG/ML
INJECTION INTRAMUSCULAR; INTRAVENOUS
Status: DISCONTINUED | OUTPATIENT
Start: 2025-09-04 | End: 2025-09-04 | Stop reason: SDUPTHER

## 2025-09-04 RX ORDER — OXYCODONE HYDROCHLORIDE 5 MG/1
5 TABLET ORAL
Status: COMPLETED | OUTPATIENT
Start: 2025-09-04 | End: 2025-09-04

## 2025-09-04 RX ORDER — MEPERIDINE HYDROCHLORIDE 25 MG/ML
12.5 INJECTION INTRAMUSCULAR; INTRAVENOUS; SUBCUTANEOUS
Status: DISCONTINUED | OUTPATIENT
Start: 2025-09-04 | End: 2025-09-04 | Stop reason: HOSPADM

## 2025-09-04 RX ORDER — MIDAZOLAM HYDROCHLORIDE 1 MG/ML
INJECTION, SOLUTION INTRAMUSCULAR; INTRAVENOUS
Status: DISCONTINUED | OUTPATIENT
Start: 2025-09-04 | End: 2025-09-04 | Stop reason: SDUPTHER

## 2025-09-04 RX ORDER — PROPOFOL 10 MG/ML
INJECTION, EMULSION INTRAVENOUS
Status: DISCONTINUED | OUTPATIENT
Start: 2025-09-04 | End: 2025-09-04 | Stop reason: SDUPTHER

## 2025-09-04 RX ORDER — DIPHENHYDRAMINE HYDROCHLORIDE 50 MG/ML
12.5 INJECTION, SOLUTION INTRAMUSCULAR; INTRAVENOUS
Status: DISCONTINUED | OUTPATIENT
Start: 2025-09-04 | End: 2025-09-04 | Stop reason: HOSPADM

## 2025-09-04 RX ORDER — FENTANYL CITRATE 50 UG/ML
INJECTION, SOLUTION INTRAMUSCULAR; INTRAVENOUS
Status: DISCONTINUED | OUTPATIENT
Start: 2025-09-04 | End: 2025-09-04 | Stop reason: SDUPTHER

## 2025-09-04 RX ADMIN — LABETALOL HYDROCHLORIDE 10 MG: 5 INJECTION, SOLUTION INTRAVENOUS at 11:18

## 2025-09-04 RX ADMIN — FENTANYL CITRATE 50 MCG: 0.05 INJECTION, SOLUTION INTRAMUSCULAR; INTRAVENOUS at 13:10

## 2025-09-04 RX ADMIN — EPHEDRINE SULFATE 10 MG: 5 INJECTION INTRAVENOUS at 11:25

## 2025-09-04 RX ADMIN — OXYCODONE 5 MG: 5 TABLET ORAL at 14:31

## 2025-09-04 RX ADMIN — PROPOFOL 200 MG: 10 INJECTION, EMULSION INTRAVENOUS at 10:47

## 2025-09-04 RX ADMIN — DEXAMETHASONE SODIUM PHOSPHATE 8 MG: 10 INJECTION INTRAMUSCULAR; INTRAVENOUS at 10:47

## 2025-09-04 RX ADMIN — MIDAZOLAM HYDROCHLORIDE 2 MG: 1 INJECTION, SOLUTION INTRAMUSCULAR; INTRAVENOUS at 10:10

## 2025-09-04 RX ADMIN — EPHEDRINE SULFATE 15 MG: 5 INJECTION INTRAVENOUS at 11:32

## 2025-09-04 RX ADMIN — ONDANSETRON 4 MG: 2 INJECTION, SOLUTION INTRAMUSCULAR; INTRAVENOUS at 10:47

## 2025-09-04 RX ADMIN — HYDROMORPHONE HYDROCHLORIDE 0.5 MG: 0.5 INJECTION, SOLUTION INTRAMUSCULAR; INTRAVENOUS; SUBCUTANEOUS at 13:22

## 2025-09-04 RX ADMIN — ROPIVACAINE HYDROCHLORIDE 40 ML: 5 INJECTION, SOLUTION EPIDURAL; INFILTRATION; PERINEURAL at 10:10

## 2025-09-04 RX ADMIN — ROCURONIUM BROMIDE 50 MG: 10 INJECTION, SOLUTION INTRAVENOUS at 10:47

## 2025-09-04 RX ADMIN — FENTANYL CITRATE 100 MCG: 50 INJECTION, SOLUTION INTRAMUSCULAR; INTRAVENOUS at 11:15

## 2025-09-04 RX ADMIN — LABETALOL HYDROCHLORIDE 10 MG: 5 INJECTION, SOLUTION INTRAVENOUS at 11:13

## 2025-09-04 RX ADMIN — SUGAMMADEX 200 MG: 100 INJECTION, SOLUTION INTRAVENOUS at 12:44

## 2025-09-04 RX ADMIN — HYDROMORPHONE HYDROCHLORIDE 0.5 MG: 0.5 INJECTION, SOLUTION INTRAMUSCULAR; INTRAVENOUS; SUBCUTANEOUS at 13:35

## 2025-09-04 RX ADMIN — SODIUM CHLORIDE, SODIUM LACTATE, POTASSIUM CHLORIDE, AND CALCIUM CHLORIDE: .6; .31; .03; .02 INJECTION, SOLUTION INTRAVENOUS at 10:00

## 2025-09-04 RX ADMIN — CEFAZOLIN 2000 MG: 2 INJECTION, POWDER, FOR SOLUTION INTRAMUSCULAR; INTRAVENOUS at 11:03

## 2025-09-04 ASSESSMENT — PAIN DESCRIPTION - ORIENTATION
ORIENTATION: RIGHT
ORIENTATION: RIGHT

## 2025-09-04 ASSESSMENT — PAIN SCALES - GENERAL
PAINLEVEL_OUTOF10: 9
PAINLEVEL_OUTOF10: 7
PAINLEVEL_OUTOF10: 8
PAINLEVEL_OUTOF10: 6
PAINLEVEL_OUTOF10: 9
PAINLEVEL_OUTOF10: 10

## 2025-09-04 ASSESSMENT — PAIN - FUNCTIONAL ASSESSMENT
PAIN_FUNCTIONAL_ASSESSMENT: 0-10
PAIN_FUNCTIONAL_ASSESSMENT: 0-10

## 2025-09-04 ASSESSMENT — PAIN DESCRIPTION - DESCRIPTORS: DESCRIPTORS: JABBING

## 2025-09-04 ASSESSMENT — PAIN DESCRIPTION - LOCATION
LOCATION: INCISION
LOCATION: SHOULDER
LOCATION: SHOULDER

## 2025-09-05 ENCOUNTER — PATIENT MESSAGE (OUTPATIENT)
Age: 50
End: 2025-09-05

## 2025-09-05 RX ORDER — GABAPENTIN 300 MG/1
CAPSULE ORAL
Qty: 60 CAPSULE | Refills: 1 | Status: SHIPPED | OUTPATIENT
Start: 2025-09-05 | End: 2025-10-05

## (undated) DEVICE — Device

## (undated) DEVICE — BLADE SURG 10 TWIN BK CARBON STL STRL CISION LF DISP

## (undated) DEVICE — MANIFOLD SUCT 4 PRT 2 CANSTR FLTR DISP NEPTUNE 2

## (undated) DEVICE — SOLUTION PREP (ON BACKORDER TEMP USE 2754422) 4OZ 3% H PEROX 1ST AID ANTISEP ORAL DEBRIDING

## (undated) DEVICE — TUBING SUCT L12FT DIA0.28125IN UNIV W/ STR SCALLOPED FEM

## (undated) DEVICE — SUTURE VICRYL SZ 0 L27IN ABSRB UD L26MM CP-2 1/2 CIR SGL J870H

## (undated) DEVICE — POSITIONER PT UNIV HD RESTRN DISP

## (undated) DEVICE — DRAPE SURG W54XL76IN STD POLYPR IMPERV U DISP

## (undated) DEVICE — TUBE IRRIG L8IN LNG PT W/ CONN FOR PMP SYS REDEUCE

## (undated) DEVICE — SUTURE PROL SZ 0 L30IN NONABSORBABLE BLU MO-6 L26MM 1/2 CIR 8418H

## (undated) DEVICE — DRAPE SURG ST 3/4 SHEET W53XL77IN STD POLYPR 3 QTR DISP

## (undated) DEVICE — SUTURE VICRYL SZ 2-0 L27IN ABSRB UD L26MM CT-2 1/2 CIR J269H

## (undated) DEVICE — NEEDLE SPNL 18GA L3.5IN W/ QNCKE SHARPER BVL DURA CLICK

## (undated) DEVICE — DRESSING FOAM 4X6 DISP POSTOP MEPILEX BORD AG

## (undated) DEVICE — CANNULA ARTHSCP L7CM ID575MM CRYS SMOOTH W OBT

## (undated) DEVICE — PAD CARING ABDN COMB STR 8X10 (16/BX 20BX/C

## (undated) DEVICE — SUTURE PROL SZ 2-0 L30IN NONABSORBABLE BLU L26MM SH 1/2 CIR 8833H

## (undated) DEVICE — SYRINGE MED 30ML STD CLR PLAS LUERLOCK TIP N CTRL DISP

## (undated) DEVICE — SPONGE GZ W4XL4IN COT 12 PLY TYP VII WVN C FLD DSGN STERILE

## (undated) DEVICE — BLADE SHAVER AGGRESSIVE + 4 MM RAZOR SHRP TOOTH RED SYNOVIUM

## (undated) DEVICE — GLOVE ORANGE PI 8 1/2   MSG9085